# Patient Record
Sex: MALE | Race: WHITE | Employment: STUDENT | URBAN - METROPOLITAN AREA
[De-identification: names, ages, dates, MRNs, and addresses within clinical notes are randomized per-mention and may not be internally consistent; named-entity substitution may affect disease eponyms.]

---

## 2021-12-13 ENCOUNTER — HOSPITAL ENCOUNTER (OUTPATIENT)
Dept: PHYSICAL THERAPY | Age: 21
Discharge: HOME | End: 2021-12-13
Payer: COMMERCIAL

## 2021-12-13 PROCEDURE — 97110 THERAPEUTIC EXERCISES: CPT | Performed by: PHYSICAL THERAPIST

## 2021-12-13 PROCEDURE — 97161 PT EVAL LOW COMPLEX 20 MIN: CPT | Performed by: PHYSICAL THERAPIST

## 2021-12-13 NOTE — PROGRESS NOTES
PT DAILY TREATMENT NOTE     Patient Name: Rolanda Hurtado  Date:2021  : 2000  [x]  Patient  Verified  Payor: Rhona Cheahtam / Plan: Obed Spence / Product Type: HMO /    In time:1059  Out time:1150  Total Treatment Time (min): 51  Visit #: 1 of     Medicare/BCBS Only   Total Timed Codes (min):  25 1:1 Treatment Time:  26       Treatment Area: Right ankle pain [M25.571]    SUBJECTIVE  Pain Level (0-10 scale): 1/10  Any medication changes, allergies to medications, adverse drug reactions, diagnosis change, or new procedure performed?: [x] No    [] Yes (see summary sheet for update)  Subjective functional status/changes:   [] No changes reported  Pt is a 24year old male status post L ankle surgery sp fall off a curb on 21. Following surgery he was in a boot for 6 weeks non weightbearing and then we was allowed to bare weight in the boot for 2 weeks and stopped wearing the boot about 6 weeks ago. Currently he rates his pain a 1/10 and at its worst a 3-4/10. Functional limitations squatting, pivoting, sprinting with quick pivot turns. He likes to play basketball. He uses ice and rest to alleviate his symptoms. His next FU is on 21 when he returns to [employer] at [place]. Negative for red flags. FOTO: 71/100. Pt would like to improve L ankle ROM and increase his speed.      OBJECTIVE    Modality rationale:    Min Type Additional Details    [] Estim:  []Unatt       []IFC  []Premod                        []Other:  []w/ice   []w/heat  Position:  Location:    [] Estim: []Att    []TENS instruct  []NMES                    []Other:  []w/US   []w/ice   []w/heat  Position:  Location:    []  Traction: [] Cervical       []Lumbar                       [] Prone          []Supine                       []Intermittent   []Continuous Lbs:  [] before manual  [] after manual    []  Ultrasound: []Continuous   [] Pulsed                           []1MHz   []3MHz W/cm2:  Location:    []  Iontophoresis with dexamethasone         Location: [] Take home patch   [] In clinic    []  Ice     []  heat  []  Ice massage  []  Laser   []  Anodyne Position:  Location:    []  Laser with stim  []  Other:  Position:  Location:    []  Vasopneumatic Device  Pre-treatment girth:   Post-treatment girth:   Measured at landmark location:  Pressure:       [] lo [] med [] hi   Temperature: [] lo [] med [] hi   [] Skin assessment post-treatment:  []intact []redness- no adverse reaction    []redness  adverse reaction:     26 min [x]Eval                  []Re-Eval       25 min Therapeutic Exercise:  [] See flow sheet : established/educated/demonstrated HEP   Rationale: increase ROM and increase strength to improve the patients ability to improve gait mechanics. With   [] TE   [] TA   [] neuro   [] other: Patient Education: [x] Review HEP    [] Progressed/Changed HEP based on:   [] positioning   [] body mechanics   [] transfers   [] heat/ice application    [] other:      Other Objective/Functional Measures:    Upon evaluation, pt ambulates with normalized gait pattern. AROM of the ankles are as follows: R: DF: 5 deg, PF: 55 deg, IN: 30 deg EV: 10, L: DF: -10 deg, PF: 40 deg, IN: 18 deg, EV: 0 deg. Functional squat: out-toeing on the L with medial knee collapse to squat deeper. Pt able to perform 20 SL HR on the L however, 1/2 ROM of the R. B SLS on floor 30 seconds. L ankle strength is a 4/5. Fall Risk Assessment: Does the patient have a fear of falling?  NO      Pain Level (0-10 scale) post treatment: 1/10    ASSESSMENT/Changes in Function:   [x]  See Plan of Care  []  See progress note/recertification  []  See Discharge Summary         Progress towards goals / Updated goals:  PER POC     PLAN  [x]  Upgrade activities as tolerated     [x]  Continue plan of care  []  Update interventions per flow sheet       []  Discharge due to:_  [x]  Other: 2-3x/week for 4 weeks       Justification for Eval Code Complexity:  Patient History : none  Examination see exam   Clinical Presentation: evolving  Clinical Decision Making : FOTO : 70 /100      Fiona Neumann DPT 12/13/2021  141 PM    Future Appointments   Date Time Provider Isaiah Naidu   12/15/2021 10:45 AM Stephanie Gregg PT [facility]   12/21/2021  1:15 PM Kaite Woodard PTA [facility]   12/23/2021  2:00 PM Silvia Burrell [facility]   12/27/2021 10:45 AM Katie Woodard PTA [facility]   12/30/2021 10:45 AM Stephanie Gregg PT [facility]   1/4/2022 10:45 AM Katie Wooadrd PTA [facility]   1/6/2022 10:45 AM [facility]

## 2021-12-13 NOTE — PROGRESS NOTES
[id number] PHYSICAL THERAPY AT [facility]  [address] [place] [address],[address], [facility], Edvin Spence  Phone [phone]  Fax [phone] / [address] [place] [address] PHYSICAL THERAPY SERVICES  Patient Name: Dale Morejon : 2000   Medical   Diagnosis: Right ankle pain [M25.571] Treatment Diagnosis: R ankle pain   Onset Date: 21     Referral Source: Lexie Tellez MD [facility]): 2021   Prior Hospitalization: See medical history Provider #: [id number]   Prior Level of Function: IND, College student, likes to play basketball   Comorbidities: None reported   Medications: Verified on Patient Summary List   The Plan of Care and following information is based on the information from the initial evaluation.   =================================================================================  Assessment / key information:  Pt is a 24year old male status post L ankle surgery sp fall off a curb on 21. Following surgery he was in a boot for 6 weeks non weightbearing and then we was allowed to bare weight in the boot for 2 weeks and stopped wearing the boot about 6 weeks ago. Currently he rates his pain a 1/10 and at its worst a 3-4/10. Functional limitations squatting, pivoting, sprinting with quick pivot turns. He likes to play basketball. He uses ice and rest to alleviate his symptoms. His next FU is on 21 when he returns to [employer] at [place] point. Negative for red flags. FOTO: 71/100. Pt would like to improve L ankle ROM and increase his speed. Upon evaluation, pt ambulates with normalized gait pattern. AROM of the ankles are as follows: R: DF: 5 deg, PF: 55 deg, IN: 30 deg EV: 10, L: DF: -10 deg, PF: 40 deg, IN: 18 deg, EV: 0 deg. Functional squat: out-toeing on the L with medial knee collapse to squat deeper. Pt able to perform 20 SL HR on the L however, 1/2 ROM of the R. B SLS on floor 30 seconds. L ankle strength is a 4/5.  Pt would benefit from a course of skilled PT to address above deficits to return to PLOF symptom free.       =================================================================================  Eval Complexity: History: LOW Complexity : Zero comorbidities / personal factors that will impact the outcome / POCExam:HIGH Complexity : 4+ Standardized tests and measures addressing body structure, function, activity limitation and / or participation in recreation  Presentation: MEDIUM Complexity : Evolving with changing characteristics  Clinical Decision Making:MEDIUM Complexity : FOTO score of 26-74Overall Complexity:LOW   Problem List: pain affecting function, decrease ROM, decrease strength, edema affecting function, impaired gait/ balance, decrease ADL/ functional abilitiies, decrease activity tolerance, decrease flexibility/ joint mobility and decrease transfer abilities   Treatment Plan may include any combination of the following: Therapeutic exercise, Therapeutic activities, Neuromuscular re-education, Physical agent/modality, Gait/balance training, Manual therapy, Patient education, Self Care training and Functional mobility training  Patient / Family readiness to learn indicated by: asking questions and trying to perform skills  Persons(s) to be included in education: patient (P)  Barriers to Learning/Limitations: None  Measures taken:    Patient Goal (s): Better ROM to be able to squat and sprint   Patient self reported health status: excellent  Rehabilitation Potential: excellent   Short Term Goals: To be accomplished in  2  weeks:  1. Pt will be IND and compliant with HEP for self-management of symptoms. 2. Pt will improve L ankle DF to 5 deg to improve ability to perform a deep squat without compensation.  Long Term Goals: To be accomplished in  4  weeks:  1. Pt will improve L ankle strength to 5/5 to be able to run without compensation.   2. Pt will be able to perform 10 deep squats with good form indicating improved functional LE strength. 3. Pt will report 0/10 pain with playing basketball to return to OF symptom free. 4. Pt will improve FOTO score to at least 86/100 as a functional indicator of improved mobility. Frequency / Duration:   Patient to be seen  2-3  times per week for 4  weeks:     Patient / Caregiver education and instruction: exercises  Therapist Signature: Derrek Gutiérrez DPT Date: 75/22/2598   Certification Period: NA Time: 1:44 PM   ===========================================================================================  I certify that the above Physical Therapy Services are being furnished while the patient is under my care. I agree with the treatment plan and certify that this therapy is necessary. Physician Signature:        Date:       Time:     Please sign and return to [facility] [place] or you may fax the signed copy to [phone]. Thank you.   Sakina Whitten MD

## 2021-12-15 ENCOUNTER — HOSPITAL ENCOUNTER (OUTPATIENT)
Dept: PHYSICAL THERAPY | Age: 21
Discharge: HOME | End: 2021-12-15
Payer: COMMERCIAL

## 2021-12-15 PROCEDURE — 97110 THERAPEUTIC EXERCISES: CPT

## 2021-12-15 PROCEDURE — 97112 NEUROMUSCULAR REEDUCATION: CPT

## 2021-12-15 PROCEDURE — 97140 MANUAL THERAPY 1/> REGIONS: CPT

## 2021-12-15 NOTE — PROGRESS NOTES
PT DAILY TREATMENT NOTE     Patient Name: Luisa Waller  Date:12/15/2021  : 2000  [x]  Patient  Verified  Payor: Paola Gross / Plan: C[place] / Product Type: HMO /    In time:10:45  Out time: 11:41   Total Treatment Time (min): 56   Visit #: 2 of     Medicare/BCBS Only   Total Timed Codes (min):  46  1:1 Treatment Time:  46        Treatment Area: Right ankle pain [M25.571]    SUBJECTIVE  Pain Level (0-10 scale): 0/10  Any medication changes, allergies to medications, adverse drug reactions, diagnosis change, or new procedure performed?: [x] No    [] Yes (see summary sheet for update)  Subjective functional status/changes:   [] No changes reported  The pt reports he was challenged by toe yoga on HEP, no questions about the exercises.      OBJECTIVE    Modality rationale:    Min Type Additional Details    [] Estim:  []Unatt       []IFC  []Premod                        []Other:  []w/ice   []w/heat  Position:  Location:    [] Estim: []Att    []TENS instruct  []NMES                    []Other:  []w/US   []w/ice   []w/heat  Position:  Location:    []  Traction: [] Cervical       []Lumbar                       [] Prone          []Supine                       []Intermittent   []Continuous Lbs:  [] before manual  [] after manual    []  Ultrasound: []Continuous   [] Pulsed                           []1MHz   []3MHz W/cm2:  Location:    []  Iontophoresis with dexamethasone         Location: [] Take home patch   [] In clinic    []  Ice     []  heat  []  Ice massage  []  Laser   []  Anodyne Position:  Location:    []  Laser with stim  []  Other:  Position:  Location:   10 (NC) [x]  Vasopneumatic Device  Pre-treatment girth: 54 cm   Post-treatment girth:   Measured at landmark location: figure 8  Pressure:       [x] lo [] med [] hi   Temperature: [x] lo [] med [] hi   [] Skin assessment post-treatment:  []intact []redness- no adverse reaction    []redness  adverse reaction:   Vasopnuematic compression justification:  Per bilateral girth measures taken and listed above the edema is considered significant and having an impact on the patient's gait      20  min Therapeutic Exercise:  [] See flow sheet :   Treadmill for improved walking tolerance  Incline stretch  Sumo squat  Standing BAPS board: fwd, side to side, CW, CCW   TG SL squat level 20   Rationale: increase ROM and increase strength to improve the patients ability to improve gait mechanics. x min Therapeutic Activity:  []  See flow sheet :   Rationale: increase ROM and increase strength  to improve the patients ability to walking tolerance      18 min Neuromuscular Re-education:  []  See flow sheet :  BOSU step up fwd, lat  Star taps on foam  Eccentric step down    Rationale: increase ROM and increase strength  to improve the patients gait mechanics    8 min Manual Therapy:  Subtalar mobs, PROM in all planes    The manual therapy interventions were performed at a separate and distinct time from the therapeutic activities interventions. Rationale: decrease pain and increase ROM to improve gait           With   [] TE   [] TA   [] neuro   [] other: Patient Education: [x] Review HEP    [] Progressed/Changed HEP based on:   [] positioning   [] body mechanics   [] transfers   [] heat/ice application    [] other:      Other Objective/Functional Measures:    Initiated per POC    Pain Level (0-10 scale) post treatment: SORE     ASSESSMENT/Changes in Function:   Verbal cues required during sumo squats to limit valgus compensation. No pain onset throughout session, subjective c/o muscle fatigue with exercises on non compliant surfaces. Plan to assess goals nv. [x]  See Plan of Care  []  See progress note/recertification  []  See Discharge Summary         Progress towards goals / Updated goals: · Short Term Goals: To be accomplished in  2  weeks:  1. Pt will be IND and compliant with HEP for self-management of symptoms.   2. Pt will improve L ankle DF to 5 deg to improve ability to perform a deep squat without compensation. · Long Term Goals: To be accomplished in  4  weeks:  1. Pt will improve L ankle strength to 5/5 to be able to run without compensation. 2. Pt will be able to perform 10 deep squats with good form indicating improved functional LE strength. 3. Pt will report 0/10 pain with playing basketball to return to OF symptom free. 4. Pt will improve FOTO score to at least 86/100 as a functional indicator of improved mobility.      PLAN  [x]  Upgrade activities as tolerated     [x]  Continue plan of care  []  Update interventions per flow sheet       []  Discharge due to:_  [x]  Other: plan to assess goals shonna Bacon, PT 12/15/2021  141 PM    Future Appointments   Date Time Provider Isaiah Naidu   12/21/2021  1:15 PM Krysta Mcfadden PTA [facility]   12/23/2021  2:00 PM Acosta Lotta [facility]   12/27/2021 10:45 AM Krysta Mcfadden PTA [facility]   12/30/2021 10:45 AM Krysta Mcfadden PTA [facility]   1/4/2022 10:45 AM Krysta Mcfadden PTA [facility]   1/6/2022 10:45 AM Horace Caldera [facility]

## 2021-12-21 ENCOUNTER — HOSPITAL ENCOUNTER (OUTPATIENT)
Dept: PHYSICAL THERAPY | Age: 21
Discharge: HOME | End: 2021-12-21
Payer: COMMERCIAL

## 2021-12-21 PROCEDURE — 97140 MANUAL THERAPY 1/> REGIONS: CPT

## 2021-12-21 PROCEDURE — 97110 THERAPEUTIC EXERCISES: CPT

## 2021-12-21 PROCEDURE — 97112 NEUROMUSCULAR REEDUCATION: CPT

## 2021-12-21 NOTE — PROGRESS NOTES
PT DAILY TREATMENT NOTE     Patient Name: Russel Slater  Date:2021  : 2000  [x]  Patient  Verified  Payor: Marianna Cespedes / Plan: Syeda Cooper / Product Type: HMO /    In time:13:18  Out time: 14:12  Total Treatment Time (min): 54  Visit #: 3 of     Medicare/BCBS Only   Total Timed Codes (min):  42 1:1 Treatment Time:  40       Treatment Area: Right ankle pain [M25.571]    SUBJECTIVE  Pain Level (0-10 scale): 0/10  Any medication changes, allergies to medications, adverse drug reactions, diagnosis change, or new procedure performed?: [x] No    [] Yes (see summary sheet for update)  Subjective functional status/changes:   [] No changes reported  Pt reports having gone to [place] for the weekend and walking a lot with some Right ankle soreness and swelling   Pt denies pain today    OBJECTIVE    Modality rationale:    Min Type Additional Details    [] Estim:  []Unatt       []IFC  []Premod                        []Other:  []w/ice   []w/heat  Position:  Location:    [] Estim: []Att    []TENS instruct  []NMES                    []Other:  []w/US   []w/ice   []w/heat  Position:  Location:    []  Traction: [] Cervical       []Lumbar                       [] Prone          []Supine                       []Intermittent   []Continuous Lbs:  [] before manual  [] after manual    []  Ultrasound: []Continuous   [] Pulsed                           []1MHz   []3MHz W/cm2:  Location:    []  Iontophoresis with dexamethasone         Location: [] Take home patch   [] In clinic   10' + 2' set up/removal [x]  Ice     []  heat  []  Ice massage  []  Laser   []  Anodyne Position:prone  Location: right ankle/foot     []  Laser with stim  []  Other:  Position:  Location:     []  Vasopneumatic Device  Pre-treatment girth:   Post-treatment girth:   Measured at landmark location:   Pressure:       [] lo [] med [] hi   Temperature: [] lo [] med [] hi   [] Skin assessment post-treatment:  []intact []redness- no adverse reaction    []redness - adverse reaction:   Vasopnuematic compression justification:  Per bilateral girth measures taken and listed above the edema is considered significant and having an impact on the patient's gait      12/10  min Therapeutic Exercise:  [] See flow sheet :   *Treadmill for improved walking tolerance  *Incline stretch  *Eccentric heel raise   *walking lunges   *Sumo squat   Rationale: increase ROM and increase strength to improve the patients ability to improve gait mechanics. 20 min Neuromuscular Re-education:  []  See flow sheet :  * BOSU step up fwd, lat   *BOSU squats   *Clock taps on foam  *Star reach and tap  *Eccentric step down   *Standing BAPS board: fwd, side to side,CW, CCW   Rationale: increase ROM and increase strength  to improve the patients gait mechanics    10' min Manual Therapy:  Subtalar mobs, PROM in all planes, scar tissue mobs    The manual therapy interventions were performed at a separate and distinct time from the therapeutic activities interventions. Rationale: decrease pain and increase ROM to improve gait           With   [] TE   [] TA   [] neuro   [] other: Patient Education: [x] Review HEP    [] Progressed/Changed HEP based on:   [] positioning   [] body mechanics   [] transfers   [] heat/ice application    [] other:      Other Objective/Functional Measures: Therex performed as per flow sheet   Repetitions progressed as per flow sheet   Added: Star reach and tap, BOSU squats    Pain Level (0-10 scale) post treatment: 0    ASSESSMENT/Changes in Function:   Pt required occasional steadying at parallel bars with star/clock taps on foam and with BOSU squats for steadying. Noted LE fatigue/shaking with eccentric step downs. Pt education on continuing to perform initial HEP. Pt tolerated treatment session with no increased pain and no adverse reactions noted.      []  See Plan of Care  []  See progress note/recertification  []  See Discharge Summary Progress towards goals / Updated goals: · Short Term Goals: To be accomplished in  2  weeks:  1. Pt will be IND and compliant with HEP for self-management of symptoms. Current: Pt reports no questions with HEP provided at initial evaluation (12/21/21) progressing   2. Pt will improve L ankle DF to 5 deg to improve ability to perform a deep squat without compensation. · Long Term Goals: To be accomplished in  4  weeks:  1. Pt will improve L ankle strength to 5/5 to be able to run without compensation. 2. Pt will be able to perform 10 deep squats with good form indicating improved functional LE strength. Current: goal in progress; patient performing sumo squats, bosu squats for increased strengthening (12/21/21)  3. Pt will report 0/10 pain with playing basketball to return to PLOF symptom free. 4. Pt will improve FOTO score to at least 86/100 as a functional indicator of improved mobility.   Current: will plan to assess near MD note (12/21/21)    PLAN  [x]  Upgrade activities as tolerated     [x]  Continue plan of care  [x]  Update interventions per flow sheet       []  Discharge due to:_  []  Other:          Carissa Stephen, PT 12/21/2021  3:00 PM     Future Appointments   Date Time Provider Isaiah Naidu   12/23/2021  2:00 PM Rosaline Everett [facility]   12/27/2021 10:45 AM Robina Pierce PTA [facility]   12/30/2021 10:45 AM Robina Pierce PTA [facility]   1/4/2022 10:45 AM Robina Pierce PTA [facility]   1/6/2022 10:45 AM Asya Hopkins DPT [facility]

## 2021-12-23 ENCOUNTER — HOSPITAL ENCOUNTER (OUTPATIENT)
Dept: PHYSICAL THERAPY | Age: 21
Discharge: HOME | End: 2021-12-23
Payer: COMMERCIAL

## 2021-12-23 PROCEDURE — 97530 THERAPEUTIC ACTIVITIES: CPT

## 2021-12-23 PROCEDURE — 97110 THERAPEUTIC EXERCISES: CPT

## 2021-12-23 PROCEDURE — 97140 MANUAL THERAPY 1/> REGIONS: CPT

## 2021-12-23 NOTE — PROGRESS NOTES
PT DAILY TREATMENT NOTE     Patient Name: Maria Isabel Davies  Date:2021  : 2000  [x]  Patient  Verified  Payor: Osmany Stevens / Plan: Vadim Limon / Product Type: HMO /    In time:2:00  Out time:2:55   Total Treatment Time (min):55  Visit #: 4 of     Medicare/BCBS Only   Total Timed Codes (min):  55 1:1 Treatment Time:  50       Treatment Area: Right ankle pain [M25.571]    SUBJECTIVE  Pain Level (0-10 scale): 0/10  Any medication changes, allergies to medications, adverse drug reactions, diagnosis change, or new procedure performed?: [x] No    [] Yes (see summary sheet for update)  Subjective functional status/changes:   [] No changes reported  I would say my biggest c/o is the swelling and lack of motion, I feel like the strength is finally coming back     OBJECTIVE    Modality rationale:    Min Type Additional Details    [] Estim:  []Unatt       []IFC  []Premod                        []Other:  []w/ice   []w/heat  Position:  Location:    [] Estim: []Att    []TENS instruct  []NMES                    []Other:  []w/US   []w/ice   []w/heat  Position:  Location:    []  Traction: [] Cervical       []Lumbar                       [] Prone          []Supine                       []Intermittent   []Continuous Lbs:  [] before manual  [] after manual    []  Ultrasound: []Continuous   [] Pulsed                           []1MHz   []3MHz W/cm2:  Location:    []  Iontophoresis with dexamethasone         Location: [] Take home patch   [] In clinic   l []  Ice     []  heat  []  Ice massage  []  Laser   []  Anodyne Position:prone  Location: right ankle/foot     []  Laser with stim  []  Other:  Position:  Location:     []  Vasopneumatic Device  Pre-treatment girth:   Post-treatment girth:   Measured at landmark location:   Pressure:       [] lo [] med [] hi   Temperature: [] lo [] med [] hi   [] Skin assessment post-treatment:  []intact []redness- no adverse reaction    []redness - adverse reaction: Vasopnuematic compression justification:  Per bilateral girth measures taken and listed above the edema is considered significant and having an impact on the patient's gait      15  min Therapeutic Exercise:  [] See flow sheet :   *Treadmill for improved walking tolerance  *Incline stretch  *Eccentric heel raise   Swinging lunges with 10#  *Sumo squat  Added wall sits with active DF    Rationale: increase ROM and increase strength to improve the patients ability to improve gait mechanics. 25 min Neuromuscular Re-education:  []  See flow sheet :  BOSU step up fwd, lat   BOSU squats  Added t-band circuit and forward backward steps in mini squat position    Star reach and tap  SLS on foam with throwing yellow MD into rebounder    Standing BAPS board: fwd, side to side,CW, CCW   Rationale: increase ROM and increase strength  to improve the patients gait mechanics    15 min Manual Therapy:  Mobs in supine and sidelying and then in kneeling position with passive stretching to increase DF   Mobs to the talus and navicular and applied leukotape to the navicular secondary to noted a navicular drop with ambulation - decrease drop noted with leukotape applied    The manual therapy interventions were performed at a separate and distinct time from the therapeutic activities interventions. Rationale: decrease pain and increase ROM to improve gait           With   [] TE   [] TA   [] neuro   [] other: Patient Education: [x] Review HEP    [] Progressed/Changed HEP based on:   [] positioning   [] body mechanics   [] transfers   [] heat/ice application    [] other:      Other Objective/Functional Measures:     Added self stretching in kneeling position with foot 2\" from wall and having patient move forward to increase stretch to increase DF - goal is great toe 6\" from wall with tapping knee to wall without having heel coming up from the floor    Added SLS on foam with throwing ball into re bounder    Added t-band circuit and monster steps - forward and backwards   Added swing lunges with #10      Pain Level (0-10 scale) post treatment: 0    ASSESSMENT/Changes in Function:   Patient reports pain to the medial aspect of the talocrural joint with performing eversion and supination   Performed: Mobs in supine and sidelying and then in kneeling position with passive stretching to increase DF with less pain to talocrural joint   Mobs to the talus and navicular and applied leukotape to the navicular secondary to noted a navicular drop with ambulation - decrease drop noted with leukotape applied   Will assess the effect of leukotape and mobs to the ankle and foot during next session  Patient did reported some increase motion with less discomfort after mobs to navicular    []  See Plan of Care  [x]  See progress note/recertification  []  See Discharge Summary         Progress towards goals / Updated goals: · Short Term Goals: To be accomplished in  2  weeks:  1. Pt will be IND and compliant with HEP for self-management of symptoms. Current: Pt reports no questions with HEP provided at initial evaluation (12/21/21) progressing   2. Pt will improve L ankle DF to 5 deg to improve ability to perform a deep squat without compensation. · Long Term Goals: To be accomplished in  4  weeks:  1. Pt will improve L ankle strength to 5/5 to be able to run without compensation. 2. Pt will be able to perform 10 deep squats with good form indicating improved functional LE strength. Current: goal in progress; patient performing sumo squats, bosu squats for increased strengthening (12/21/21)  3. Pt will report 0/10 pain with playing basketball to return to PLOF symptom free. 4. Pt will improve FOTO score to at least 86/100 as a functional indicator of improved mobility.   Current: will plan to assess near MD note (12/21/21)    PLAN  [x]  Upgrade activities as tolerated     [x]  Continue plan of care  [x]  Update interventions per flow sheet       [] Discharge due to:_  []  Other:          Saul Corrales, PTA 12/23/2021  3:00 PM     Future Appointments   Date Time Provider Isaiah Naidu   12/27/2021 10:45 AM Selene Sniff [facility]   12/30/2021 10:45 AM Selene Sniff [facility]   1/4/2022 10:45 AM Chela Miranda PTA [facility]   1/6/2022 10:45 AM Vicenta Nelson, DPT [facility]

## 2021-12-27 ENCOUNTER — HOSPITAL ENCOUNTER (OUTPATIENT)
Dept: PHYSICAL THERAPY | Age: 21
Discharge: HOME | End: 2021-12-27
Payer: COMMERCIAL

## 2021-12-27 PROCEDURE — 97112 NEUROMUSCULAR REEDUCATION: CPT

## 2021-12-27 PROCEDURE — 97140 MANUAL THERAPY 1/> REGIONS: CPT

## 2021-12-27 PROCEDURE — 97110 THERAPEUTIC EXERCISES: CPT

## 2021-12-27 NOTE — PROGRESS NOTES
PT DAILY TREATMENT NOTE     Patient Name: Lisa August  Date:2021  : 2000  [x]  Patient  Verified  Payor: Noe Pugh / Plan: Chet Gibbs / Product Type: HMO /    In time:10:47  Out time:11:35  Total Treatment Time (min):48  Visit #: 5 of     Medicare/BCBS Only   Total Timed Codes (min): 48 1:1 Treatment Time:  43       Treatment Area: Right ankle pain [M25.571]    SUBJECTIVE  Pain Level (0-10 scale): 0/10  Any medication changes, allergies to medications, adverse drug reactions, diagnosis change, or new procedure performed?: [x] No    [] Yes (see summary sheet for update)  Subjective functional status/changes:   [] No changes reported  I liked the tape it felt like it was giving my foot some support but it only last a day because of the taking a shower.  I have been practicing my stretch against the wall to get that better, I think it is going more now   OBJECTIVE    Modality rationale:    Min Type Additional Details    [] Estim:  []Unatt       []IFC  []Premod                        []Other:  []w/ice   []w/heat  Position:  Location:    [] Estim: []Att    []TENS instruct  []NMES                    []Other:  []w/US   []w/ice   []w/heat  Position:  Location:    []  Traction: [] Cervical       []Lumbar                       [] Prone          []Supine                       []Intermittent   []Continuous Lbs:  [] before manual  [] after manual    []  Ultrasound: []Continuous   [] Pulsed                           []1MHz   []3MHz W/cm2:  Location:    []  Iontophoresis with dexamethasone         Location: [] Take home patch   [] In clinic   l []  Ice     []  heat  []  Ice massage  []  Laser   []  Anodyne Position:prone  Location: right ankle/foot     []  Laser with stim  []  Other:  Position:  Location:     []  Vasopneumatic Device  Pre-treatment girth:   Post-treatment girth:   Measured at landmark location:   Pressure:       [] lo [] med [] hi   Temperature: [] lo [] med [] hi   [] Skin assessment post-treatment:  []intact []redness- no adverse reaction    []redness - adverse reaction:   Vasopnuematic compression justification:  Per bilateral girth measures taken and listed above the edema is considered significant and having an impact on the patient's gait      15 min Therapeutic Exercise:  [] See flow sheet :   Treadmill for improved walking tolerance  Incline stretch  Eccentric heel raise   Swinging lunges with 10#    Added wall sits with active DF    Rationale: increase ROM and increase strength to improve the patients ability to improve gait mechanics. 18 min Neuromuscular Re-education:  []  See flow sheet :    BOSU squats  Added t-band circuit and forward backward steps in mini squat position    Star reach and tap  SLS on foam with throwing blue MD into rebounder    Standing BAPS board: fwd, side to side,CW, CCW - level 4   Rationale: increase ROM and increase strength  to improve the patients gait mechanics    15 min Manual Therapy:  Mobs in supine and prone and then in kneeling position with passive stretching to increase DF and contract/relax to increase motion    Mobs to the talus and navicular and applied leukotape to the navicular secondary to noted a navicular drop with ambulation - decrease drop noted with leukotape applied    The manual therapy interventions were performed at a separate and distinct time from the therapeutic activities interventions.   Rationale: decrease pain and increase ROM to improve gait           With   [] TE   [] TA   [] neuro   [] other: Patient Education: [x] Review HEP    [] Progressed/Changed HEP based on:   [] positioning   [] body mechanics   [] transfers   [] heat/ice application    [] other:      Other Objective/Functional Measures:   Patient was able to perform passive stretch in kneeling against wall - able to pull foot/great toe back - 2 1/2 inch and lean forward to touch patella to wall - was 1 inch last visit  Added mini -squats on BOSU - bubble side down with good control  Increased BAP board to level 4 - no pain with CW and CCW [place]     GOALS  3. Pt will report 0/10 pain with playing basketball to return to PLOF symptom free. - have not attempted basketball yet to assess if patient is able to return to play 12/27/2021    Pain Level (0-10 scale) post treatment: 0    ASSESSMENT/Changes in Function:   Patient reported less pain to the medial aspect of the talocrural joint with performing eversion and supination   Performed: Mobs in supine and prone and then in kneeling position with passive stretching to increase DF and contract/relax to increase motion  As well with patient in prone   Mobs to the talus and navicular and applied leukotape to the navicular secondary to noted a navicular drop with ambulation - decrease drop noted with leukotape applied   Patient responded well to leuko tape for navicular drop last session    []  See Plan of Care  [x]  See progress note/recertification  []  See Discharge Summary         Progress towards goals / Updated goals: · Short Term Goals: To be accomplished in  2  weeks:  1. Pt will be IND and compliant with HEP for self-management of symptoms. Current: Pt reports no questions with HEP provided at initial evaluation (12/21/21) progressing   2. Pt will improve L ankle DF to 5 deg to improve ability to perform a deep squat without compensation. · Long Term Goals: To be accomplished in  4  weeks:  1. Pt will improve L ankle strength to 5/5 to be able to run without compensation. 2. Pt will be able to perform 10 deep squats with good form indicating improved functional LE strength. Current: goal in progress; patient performing sumo squats, bosu squats for increased strengthening (12/21/21)  3. Pt will report 0/10 pain with playing basketball to return to PLOF symptom free. - have not attempted basketball yet to assess if patient is able to return to play 12/27/2021  4.  Pt will improve FOTO score to at least 86/100 as a functional indicator of improved mobility.   Current: will plan to assess near MD note (12/21/21)    PLAN  [x]  Upgrade activities as tolerated     [x]  Continue plan of care  [x]  Update interventions per flow sheet       []  Discharge due to:_  []  Other:          Jayda Dutton PTA 12/27/2021  3:00 PM     Future Appointments   Date Time Provider Isaiah Naidu   12/30/2021 10:45 AM Bel Dutta DPT [facility]   1/4/2022 10:45 AM Greta Graves PTA [facility]   1/6/2022 10:45 AM Bel Dutta DPT [facility]

## 2021-12-30 ENCOUNTER — HOSPITAL ENCOUNTER (OUTPATIENT)
Dept: PHYSICAL THERAPY | Age: 21
Discharge: HOME | End: 2021-12-30
Payer: COMMERCIAL

## 2021-12-30 PROCEDURE — 97112 NEUROMUSCULAR REEDUCATION: CPT | Performed by: PHYSICAL THERAPIST

## 2021-12-30 PROCEDURE — 97140 MANUAL THERAPY 1/> REGIONS: CPT | Performed by: PHYSICAL THERAPIST

## 2021-12-30 PROCEDURE — 97110 THERAPEUTIC EXERCISES: CPT | Performed by: PHYSICAL THERAPIST

## 2021-12-30 NOTE — PROGRESS NOTES
PT DAILY TREATMENT NOTE     Patient Name: Taursu Draft  Date:2021  : 2000  [x]  Patient  Verified  Payor: Matt Leonard / Plan: Quirino Galvan / Product Type: HMO /    In time: 8398   Out time:1146  Total Treatment Time (min):60  Visit #: 6 of     Medicare/BCBS Only   Total Timed Codes (min): 60 1:1 Treatment Time:  53       Treatment Area: Right ankle pain [M25.571]    SUBJECTIVE  Pain Level (0-10 scale): 0/10  Any medication changes, allergies to medications, adverse drug reactions, diagnosis change, or new procedure performed?: [x] No    [] Yes (see summary sheet for update)  Subjective functional status/changes:   [] No changes reported  Pt reports that he has tried running after his workouts for about a .5 miles. He still is not able to sprint.      OBJECTIVE    Modality rationale:    Min Type Additional Details    [] Estim:  []Unatt       []IFC  []Premod                        []Other:  []w/ice   []w/heat  Position:  Location:    [] Estim: []Att    []TENS instruct  []NMES                    []Other:  []w/US   []w/ice   []w/heat  Position:  Location:    []  Traction: [] Cervical       []Lumbar                       [] Prone          []Supine                       []Intermittent   []Continuous Lbs:  [] before manual  [] after manual    []  Ultrasound: []Continuous   [] Pulsed                           []1MHz   []3MHz W/cm2:  Location:    []  Iontophoresis with dexamethasone         Location: [] Take home patch   [] In clinic   PD []  Ice     []  heat  []  Ice massage  []  Laser   []  Anodyne Position:prone  Location: right ankle/foot     []  Laser with stim  []  Other:  Position:  Location:     []  Vasopneumatic Device  Pre-treatment girth:   Post-treatment girth:   Measured at landmark location:   Pressure:       [] lo [] med [] hi   Temperature: [] lo [] med [] hi   [] Skin assessment post-treatment:  []intact []redness- no adverse reaction    []redness - adverse reaction: Vasopnuematic compression justification:  Per bilateral girth measures taken and listed above the edema is considered significant and having an impact on the patient's gait      15 min Therapeutic Exercise:  [] See flow sheet :   Treadmill for improved walking tolerance  Incline stretch  Eccentric heel raise   Swinging lunges with 10# wall sits with active DF    Rationale: increase ROM and increase strength to improve the patients ability to improve gait mechanics. 30 min Neuromuscular Re-education:  []  See flow sheet :    BOSU squats  Added t-band circuit and forward backward steps in mini squat position-TC    Star reach and tap  SLS on foam with throwing blue MD into rebounder -TC    Standing BAPS board: fwd, side to side,CW, CCW - level 4  Planks with clamshell   Rationale: increase ROM and increase strength  to improve the patients gait mechanics    15 min Manual Therapy:  Sub-talor mobs grade IV with passive stretch to the R, scar massage, proximal AP mobs grade IV of fibular head   The manual therapy interventions were performed at a separate and distinct time from the therapeutic activities interventions. Rationale: decrease pain and increase ROM to improve gait           With   [] TE   [] TA   [] neuro   [] other: Patient Education: [x] Review HEP    [] Progressed/Changed HEP based on:   [] positioning   [] body mechanics   [] transfers   [] heat/ice application    [] other:      Other Objective/Functional Measures:   Pt maintained functional DF ROM of 2.5 inches  Fatigued with added therex    Pain Level (0-10 scale) post treatment: 0    ASSESSMENT/Changes in Function:   Pt tolerated all therex without increased pain. Continues to lack full functional ROM noted with today's measurements. Continue to progress towards plyometric strengthening to return to playing sports pain free.      []  See Plan of Care  [x]  See progress note/recertification  []  See Discharge Summary         Progress towards goals / Updated goals: · Short Term Goals: To be accomplished in  2  weeks:  1. Pt will be IND and compliant with HEP for self-management of symptoms. Current: Pt reports no questions with HEP provided at initial evaluation (12/21/21) progressing   2. Pt will improve L ankle DF to 5 deg to improve ability to perform a deep squat without compensation. · Long Term Goals: To be accomplished in  4  weeks:  1. Pt will improve L ankle strength to 5/5 to be able to run without compensation. 2. Pt will be able to perform 10 deep squats with good form indicating improved functional LE strength. Current: goal in progress; patient performing sumo squats, bosu squats for increased strengthening (12/21/21)  3. Pt will report 0/10 pain with playing basketball to return to PLOF symptom free. - have not attempted basketball yet to assess if patient is able to return to play 12/27/2021  4. Pt will improve FOTO score to at least 86/100 as a functional indicator of improved mobility.   Current: will plan to assess near MD note (12/21/21)    PLAN  [x]  Upgrade activities as tolerated     [x]  Continue plan of care  [x]  Update interventions per flow sheet       []  Discharge due to:_  [x]  Other:  Check goals HERNANDEZ Tellez DPT 12/30/2021  1:00  PM     Future Appointments   Date Time Provider Isaiah Naidu   1/4/2022 10:45 AM Nicolasa Guy PTA [facility]   1/6/2022 10:45 AM Inez Shipman DPT [facility]

## 2022-01-04 ENCOUNTER — HOSPITAL ENCOUNTER (OUTPATIENT)
Dept: PHYSICAL THERAPY | Age: 22
Discharge: HOME | End: 2022-01-04
Payer: COMMERCIAL

## 2022-01-04 PROCEDURE — 97112 NEUROMUSCULAR REEDUCATION: CPT

## 2022-01-04 PROCEDURE — 97110 THERAPEUTIC EXERCISES: CPT

## 2022-01-04 PROCEDURE — 97140 MANUAL THERAPY 1/> REGIONS: CPT

## 2022-01-04 NOTE — PROGRESS NOTES
PT DAILY TREATMENT NOTE     Patient Name: Ryao Vidales  Date:2022  : 2000  [x]  Patient  Verified  Payor: Jenny Abdalla / Plan: Bartolo Hines / Product Type: HMO /    In time:    Out time: 11:32   Total Treatment Time (min): 47   Visit #: 7 of     Medicare/BCBS Only   Total Timed Codes (min): 47  1:1 Treatment Time:  47        Treatment Area: Right ankle pain [M25.571]    SUBJECTIVE  Pain Level (0-10 scale): 0/10  Any medication changes, allergies to medications, adverse drug reactions, diagnosis change, or new procedure performed?: [x] No    [] Yes (see summary sheet for update)  Subjective functional status/changes:   [] No changes reported  The pt reports no soreness after last visit. He states he is able to [employer]" sprint now when running.      OBJECTIVE    Modality rationale:    Min Type Additional Details    [] Estim:  []Unatt       []IFC  []Premod                        []Other:  []w/ice   []w/heat  Position:  Location:    [] Estim: []Att    []TENS instruct  []NMES                    []Other:  []w/US   []w/ice   []w/heat  Position:  Location:    []  Traction: [] Cervical       []Lumbar                       [] Prone          []Supine                       []Intermittent   []Continuous Lbs:  [] before manual  [] after manual    []  Ultrasound: []Continuous   [] Pulsed                           []1MHz   []3MHz W/cm2:  Location:    []  Iontophoresis with dexamethasone         Location: [] Take home patch   [] In clinic   PD []  Ice     []  heat  []  Ice massage  []  Laser   []  Anodyne Position:prone  Location: right ankle/foot     []  Laser with stim  []  Other:  Position:  Location:     []  Vasopneumatic Device  Pre-treatment girth:   Post-treatment girth:   Measured at landmark location:   Pressure:       [] lo [] med [] hi   Temperature: [] lo [] med [] hi   [] Skin assessment post-treatment:  []intact []redness- no adverse reaction    []redness - adverse reaction: Vasopnuematic compression justification:  Per bilateral girth measures taken and listed above the edema is considered significant and having an impact on the patient's gait      18  min Therapeutic Exercise:  [] See flow sheet :   Treadmill for improved walking tolerance  Incline stretch  Eccentric heel raise   Swinging lunges with 10#   wall sits with active DF    Rationale: increase ROM and increase strength to improve the patients ability to improve gait mechanics. 21  min Neuromuscular Re-education:  []  See flow sheet :  Squat jump   Eccentric step down into R single leg squat   BOSU squats  t-band circuit and forward backward steps in mini squat position-TC    Star reach and tap  SLS on BOSU  with throwing red MB into rebounder    Standing BAPS board: fwd, side to side,CW, CCW - level 4  Planks with clamshell   Rationale: increase ROM and increase strength  to improve the patients gait mechanics    8  min Manual Therapy:  Sub-talor mobs grade IV with passive stretch to the R, scar massage, proximal AP mobs grade IV of fibular head   The manual therapy interventions were performed at a separate and distinct time from the therapeutic activities interventions. Rationale: decrease pain and increase ROM to improve gait           With   [] TE   [] TA   [] neuro   [] other: Patient Education: [x] Review HEP    [] Progressed/Changed HEP based on:   [] positioning   [] body mechanics   [] transfers   [] heat/ice application    [] other:      Other Objective/Functional Measures:  Increased repetitions of eccentric heel raises   Introduced squat jumps, and eccentric step down to single leg squat, SLS on BOSU with rebounder RMB     Pain Level (0-10 scale) post treatment: 0 /10     ASSESSMENT/Changes in Function:   Introduced jumping exercises today to facilitate return to recreational basketball, performed all exercises without pain onset. He continues to be challenged by swing lunges.  Plan to update HEP and reassess nv for potential discharge as the patient will be returning to college. []  See Plan of Care  [x]  See progress note/recertification  []  See Discharge Summary         Progress towards goals / Updated goals: · Short Term Goals: To be accomplished in  2  weeks:  1. Pt will be IND and compliant with HEP for self-management of symptoms. Current: Pt reports no questions with HEP provided at initial evaluation (12/21/21) progressing   2. Pt will improve L ankle DF to 5 deg to improve ability to perform a deep squat without compensation. · Long Term Goals: To be accomplished in  4  weeks:  1. Pt will improve L ankle strength to 5/5 to be able to run without compensation. Increased repetitions of eccentric heel raises, patient able to \"partially\" sprint when running 01/04/2022  2. Pt will be able to perform 10 deep squats with good form indicating improved functional LE strength. Current: goal in progress; patient performing sumo squats, bosu squats for increased strengthening (12/21/21)  3. Pt will report 0/10 pain with playing basketball to return to PLOF symptom free. - have not attempted basketball yet to assess if patient is able to return to play 12/27/2021  4. Pt will improve FOTO score to at least 86/100 as a functional indicator of improved mobility.   Current: will plan to assess near MD note (12/21/21)    PLAN  [x]  Upgrade activities as tolerated     [x]  Continue plan of care  [x]  Update interventions per flow sheet       []  Discharge due to:_  [x]  Other:  Plan to reassess nv for potential d/c as the patient will be returning to college         Adam Galvan, PT 1/4/2022  1:00  PM     Future Appointments   Date Time Provider Isaiah Naidu   1/6/2022 10:45 AM [facility]

## 2022-01-06 ENCOUNTER — HOSPITAL ENCOUNTER (OUTPATIENT)
Dept: PHYSICAL THERAPY | Age: 22
Discharge: HOME | End: 2022-01-06
Payer: COMMERCIAL

## 2022-01-06 PROCEDURE — 97112 NEUROMUSCULAR REEDUCATION: CPT

## 2022-01-06 PROCEDURE — 97110 THERAPEUTIC EXERCISES: CPT

## 2022-01-06 NOTE — PROGRESS NOTES
PT DAILY TREATMENT NOTE     Patient Name: Leona Pueblo  Date:2022  : 2000  [x]  Patient  Verified  Payor: Anamika Fouzia / Plan: Juvenal Terrell / Product Type: HMO /    In time:    Out time: 11:22    Total Treatment Time (min): 37  Visit #: 8 of     Medicare/BCBS Only   Total Timed Codes (min): 37  1:1 Treatment Time:  37        Treatment Area: Right ankle pain [M25.571]    SUBJECTIVE  Pain Level (0-10 scale): 0/10  Any medication changes, allergies to medications, adverse drug reactions, diagnosis change, or new procedure performed?: [x] No    [] Yes (see summary sheet for update)  Subjective functional status/changes:   [] No changes reported    % improved: 85%  Functional gains: improvements in all functions, can play basketball, partially sprint  Functional limitations: confidence in ankle    FOTO: 91/100     OBJECTIVE    Modality rationale:    Min Type Additional Details    [] Estim:  []Unatt       []IFC  []Premod                        []Other:  []w/ice   []w/heat  Position:  Location:    [] Estim: []Att    []TENS instruct  []NMES                    []Other:  []w/US   []w/ice   []w/heat  Position:  Location:    []  Traction: [] Cervical       []Lumbar                       [] Prone          []Supine                       []Intermittent   []Continuous Lbs:  [] before manual  [] after manual    []  Ultrasound: []Continuous   [] Pulsed                           []1MHz   []3MHz W/cm2:  Location:    []  Iontophoresis with dexamethasone         Location: [] Take home patch   [] In clinic   PD []  Ice     []  heat  []  Ice massage  []  Laser   []  Anodyne Position:prone  Location: right ankle/foot     []  Laser with stim  []  Other:  Position:  Location:     []  Vasopneumatic Device  Pre-treatment girth:   Post-treatment girth:   Measured at landmark location:   Pressure:       [] lo [] med [] hi   Temperature: [] lo [] med [] hi   [] Skin assessment post-treatment:  []intact []redness- no adverse reaction    []redness - adverse reaction:   Vasopnuematic compression justification:  Per bilateral girth measures taken and listed above the edema is considered significant and having an impact on the patient's gait      17  min Therapeutic Exercise:  [] See flow sheet :   Treadmill for improved walking tolerance  Incline stretch  Single leg heel raises   Swinging lunges with 10# - TC   wall sits with active DF    Rationale: increase ROM and increase strength to improve the patients ability to improve gait mechanics. 20  min Neuromuscular Re-education:  []  See flow sheet :  REASSESSMENT, FOTO, HEP DEVELOPMENT   Squat jump   Eccentric step down into R single leg squat   BOSU squats -TC   t-band circuit and forward backward steps in mini squat position-TC    Star reach and tap -tc   SLS on BOSU  with throwing red MB into rebounder  - TC   Standing BAPS board: fwd, side to side,CW, CCW - level 4 - tc   Planks with clamshell   Rationale: increase ROM and increase strength  to improve the patients gait mechanics    HELD  min Manual Therapy:  Sub-talor mobs grade IV with passive stretch to the R, scar massage, proximal AP mobs grade IV of fibular head   The manual therapy interventions were performed at a separate and distinct time from the therapeutic activities interventions. Rationale: decrease pain and increase ROM to improve gait           With   [] TE   [] TA   [] neuro   [] other: Patient Education: [x] Review HEP    [] Progressed/Changed HEP based on:   [] positioning   [] body mechanics   [] transfers   [] heat/ice application    [] other:      Other Objective/Functional Measures:  10 sumo squats: good form, no limitation    L ankle dorsiflexion AROM: 10 deg  L ankle strength: DF 5/5, PF 4+/5, EVR 5/5, INV 5/5     Pain Level (0-10 scale) post treatment: 0 /10     ASSESSMENT/Changes in Function:   Upon reassessment, the pt has made steady gains towards all therapy goals.  He reports no functional limitations and has returned to all recreational exercise activities. Updated and reviewed HEP. The pt will be discharged today with an HEP for continued self management of symptoms. []  See Plan of Care  [x]  See progress note/recertification  []  See Discharge Summary         Progress towards goals / Updated goals: · Short Term Goals: To be accomplished in  2  weeks:  1. Pt will be IND and compliant with HEP for self-management of symptoms. Current: Pt reports no questions with HEP provided at initial evaluation (12/21/21) progressing   2. Pt will improve L ankle DF to 5 deg to improve ability to perform a deep squat without compensation. MET 10 deg 01/06/2022  · Long Term Goals: To be accomplished in  4  weeks:  1. Pt will improve L ankle strength to 5/5 to be able to run without compensation. Increased repetitions of eccentric heel raises, patient able to \"partially\" sprint when running 01/04/2022, DF 5/5, PF 4+/5, EVR 5/5, INV 5/5 01/06/2022   2. Pt will be able to perform 10 deep squats with good form indicating improved functional LE strength. Current: goal in progress; patient performing sumo squats, bosu squats for increased strengthening (12/21/21), MET 10 repetitions without deviation 01/06/2022  3. Pt will report 0/10 pain with playing basketball to return to PLOF symptom free. - have not attempted basketball yet to assess if patient is able to return to play 12/27/2021, patient has returned to playing basketball without issue 01/06/2022  4. Pt will improve FOTO score to at least 86/100 as a functional indicator of improved mobility.   Current: will plan to assess near MD note (12/21/21), MET 91/100 01/06/2022    PLAN  []  Upgrade activities as tolerated     []  Continue plan of care  [x]  Update interventions per flow sheet       [x]  Discharge due to: pt progress towards therapy goals   []  Other:      Viraj Alonso PT 1/6/2022  1:00  PM     Future Appointments   Date Time Provider Iasiah Naidu   1/6/2022 10:45 AM Jose Tariq PT [facility]

## 2022-01-07 NOTE — PROGRESS NOTES
[address] [facility] AT [facility]  [address] [place] [address],[address], [place], [place]  Phone [phone]  Fax  SUMMARY  Patient Name: Burak Llamas : 2000   Treatment/Medical Diagnosis: Right ankle pain [M25.571]   Referral Source: Maryse Godfrey MD     Date of Initial Visit: 2021 Attended Visits: 8 Missed Visits: 0     SUMMARY OF TREATMENT  Thank you for this referral. The pt has been seen for 8 visits to address right ankle pain. Therapeutic interventions have included therapeutic exercise, therapeutic activity, neuromuscular re-education, manual treatment, modalities and patient education. CURRENT STATUS  Subjective functional status/changes:   % improved: 85%  Functional gains: improvements in all functions, can play basketball, partially sprint  Functional limitations: confidence in ankle    FOTO: 91/100     Other Objective/Functional Measures:  10 sumo squats: good form, no limitation    L ankle dorsiflexion AROM: 10 deg  L ankle strength: DF 5/5, PF 4+/5, EVR 5/5, INV 5/5      Pain Level (0-10 scale) post treatment: 0 /10     Previous objective measures from  on 2021:  AROM of the ankles are as follows: R: DF: 5 deg, PF: 55 deg, IN: 30 deg EV: 10, L: DF: -10 deg, PF: 40 deg, IN: 18 deg, EV: 0 deg. Functional squat: out-toeing on the L with medial knee collapse to squat deeper. Pt able to perform 20 SL HR on the L however, 1/2 ROM of the R. B SLS on floor 30 seconds. L ankle strength is a 4/5.     ASSESSMENT/Changes in Function:   Upon reassessment, the pt has made steady gains towards all therapy goals. He reports no functional limitations and has returned to all recreational exercise activities. Updated and reviewed HEP. The pt will be discharged today with an HEP for continued self management of symptoms. Progress towards goals / Updated goals: · Short Term Goals: To be accomplished in  2  weeks:  1.  Pt will be IND and compliant with HEP for self-management of symptoms. Current: Pt reports no questions with HEP provided at initial evaluation (12/21/21) progressing   2. Pt will improve L ankle DF to 5 deg to improve ability to perform a deep squat without compensation. MET 10 deg 01/06/2022  · Long Term Goals: To be accomplished in  4  weeks:  1. Pt will improve L ankle strength to 5/5 to be able to run without compensation. Increased repetitions of eccentric heel raises, patient able to \"partially\" sprint when running 01/04/2022, DF 5/5, PF 4+/5, EVR 5/5, INV 5/5 01/06/2022   2. Pt will be able to perform 10 deep squats with good form indicating improved functional LE strength. Current: goal in progress; patient performing sumo squats, bosu squats for increased strengthening (12/21/21), MET 10 repetitions without deviation 01/06/2022  3. Pt will report 0/10 pain with playing basketball to return to PLOF symptom free. - have not attempted basketball yet to assess if patient is able to return to play 12/27/2021, patient has returned to playing basketball without issue 01/06/2022  4. Pt will improve FOTO score to at least 86/100 as a functional indicator of improved mobility. Current: will plan to assess near MD note (12/21/21), MET 91/100 01/06/2022    RECOMMENDATIONS  Discontinue therapy. Progressing towards or have reached established goals. If you have any questions/comments please contact us directly at [phone]. Thank you for allowing us to assist in the care of your patient.     Therapist Signature: Jaime Hernandez, PT Date: 01/07/2022     Time: 7:38 AM